# Patient Record
Sex: MALE | Race: WHITE | Employment: FULL TIME | URBAN - NONMETROPOLITAN AREA
[De-identification: names, ages, dates, MRNs, and addresses within clinical notes are randomized per-mention and may not be internally consistent; named-entity substitution may affect disease eponyms.]

---

## 2019-08-31 ENCOUNTER — HOSPITAL ENCOUNTER (EMERGENCY)
Age: 29
Discharge: HOME | End: 2019-08-31
Attending: EMERGENCY MEDICINE
Payer: COMMERCIAL

## 2019-08-31 VITALS
RESPIRATION RATE: 16 BRPM | TEMPERATURE: 98.3 F | HEIGHT: 73 IN | HEART RATE: 88 BPM | WEIGHT: 270 LBS | DIASTOLIC BLOOD PRESSURE: 87 MMHG | SYSTOLIC BLOOD PRESSURE: 148 MMHG | OXYGEN SATURATION: 100 % | BODY MASS INDEX: 35.78 KG/M2

## 2019-08-31 DIAGNOSIS — S01.01XA LACERATION OF SCALP, INITIAL ENCOUNTER: Primary | ICD-10-CM

## 2019-08-31 PROCEDURE — 6370000000 HC RX 637 (ALT 250 FOR IP): Performed by: EMERGENCY MEDICINE

## 2019-08-31 PROCEDURE — 90715 TDAP VACCINE 7 YRS/> IM: CPT | Performed by: EMERGENCY MEDICINE

## 2019-08-31 PROCEDURE — 6360000002 HC RX W HCPCS: Performed by: EMERGENCY MEDICINE

## 2019-08-31 PROCEDURE — 90471 IMMUNIZATION ADMIN: CPT | Performed by: EMERGENCY MEDICINE

## 2019-08-31 PROCEDURE — 99282 EMERGENCY DEPT VISIT SF MDM: CPT

## 2019-08-31 PROCEDURE — 12001 RPR S/N/AX/GEN/TRNK 2.5CM/<: CPT

## 2019-08-31 PROCEDURE — 2709999900 HC NON-CHARGEABLE SUPPLY

## 2019-08-31 RX ORDER — BACITRACIN, NEOMYCIN, POLYMYXIN B 400; 3.5; 5 [USP'U]/G; MG/G; [USP'U]/G
OINTMENT TOPICAL ONCE
Status: COMPLETED | OUTPATIENT
Start: 2019-08-31 | End: 2019-08-31

## 2019-08-31 RX ADMIN — TETANUS TOXOID, REDUCED DIPHTHERIA TOXOID AND ACELLULAR PERTUSSIS VACCINE, ADSORBED 0.5 ML: 5; 2.5; 8; 8; 2.5 SUSPENSION INTRAMUSCULAR at 21:44

## 2019-08-31 RX ADMIN — BACITRACIN, NEOMYCIN, POLYMYXIN B 1 G: 400; 3.5; 5 OINTMENT TOPICAL at 21:44

## 2019-08-31 ASSESSMENT — PAIN DESCRIPTION - LOCATION: LOCATION: HEAD

## 2019-08-31 ASSESSMENT — PAIN SCALES - GENERAL
PAINLEVEL_OUTOF10: 2
PAINLEVEL_OUTOF10: 0

## 2019-08-31 ASSESSMENT — PAIN DESCRIPTION - DESCRIPTORS: DESCRIPTORS: DULL

## 2019-09-01 NOTE — ED PROVIDER NOTES
[address] [address]  Phone: [phone]    Emergency Department encounter      [facility]    Chief Complaint   Patient presents with   Danuta Schools is a 34 y.o. male who presents noted complaint. Patient opened a car door and hit his head on the top of the door. He has a C-shaped 2 cm laceration. Denies loss of consciousness vomiting irritability or mental status changes. PAST MEDICAL HISTORY    History reviewed. No pertinent past medical history.     SURGICAL HISTORY    Past Surgical History:   Procedure Laterality Date    WISDOM TOOTH EXTRACTION         CURRENT MEDICATIONS        ALLERGIES    No Known Allergies    FAMILY HISTORY    Family History   Problem Relation Age of Onset    Diabetes Mother     High Blood Pressure Mother        SOCIAL HISTORY    Social History     Socioeconomic History    Marital status: Single     Spouse name: None    Number of children: None    Years of education: None    Highest education level: None   Occupational History    None   Social Needs    Financial resource strain: None    Food insecurity:     Worry: None     Inability: None    Transportation needs:     Medical: None     Non-medical: None   Tobacco Use    Smoking status: Never Smoker    Smokeless tobacco: Never Used   Substance and Sexual Activity    Alcohol use: Yes     Comment: occ    Drug use: Never    Sexual activity: None   Lifestyle    Physical activity:     Days per week: None     Minutes per session: None    Stress: None   Relationships    Social connections:     Talks on phone: None     Gets together: None     Attends [employer]ism service: None     Active member of club or organization: None     Attends meetings of clubs or organizations: None     Relationship status: None    Intimate partner violence:     Fear of current or ex partner: None     Emotionally abused: None     Physically abused: None     Forced Antibiotic ointment    Patient tolerance of procedure: Tolerated well, no immediate complications          CONSULTS:  None        ED COURSE & MEDICAL DECISION MAKING    Pertinent Labs & Imaging studies reviewed. (See chart for details)  Isolated laceration to the scalp. No loss of consciousness no blood thinners. No neurological symptoms. Blood pressure is elevated although he is under stress from the trauma. He has a 1 similar laceration to the scalp. This does open slightly. I offered staples plus or minus lidocaine. He went ahead and tested staples. 2 were placed without complication. REASSESSMENT  Patient rechecked and updated on lab/xray status, progress and results. .  Patient was reassessed and condition was improved after tx. No further needs at this time. Tetanus will be updated. SCREENINGS  BP (!) 162/94   Pulse 103   Temp 98.3 °F (36.8 °C)   Resp 16   Ht 6' 1\" (1.854 m)   Wt 270 lb (122.5 kg)   SpO2 100%   BMI 35.62 kg/m²      No orders to display       Screening For Hypertension and Follow-up (#317)  patient informed that blood pressure is abnormal and in the pre-hypertension range and should be rechecked by primary care      Screening For Tobacco Use and Cessation Intervention (#226):   reports that he has never smoked. He has never used smokeless tobacco.  Non-smoker not applicable for screen        FINAL IMPRESSION    1.  Laceration of scalp, initial encounter         PATIENT REFERRED TO:  Primary care doctor    In 1 week  Staple removal in 1 week      DISCHARGE MEDICATIONS:  New Prescriptions    No medications on file           Mango Leggett MD  09/01/19 9921

## 2020-02-16 ENCOUNTER — HOSPITAL ENCOUNTER (EMERGENCY)
Age: 30
Discharge: HOME | End: 2020-02-16
Attending: EMERGENCY MEDICINE
Payer: COMMERCIAL

## 2020-02-16 ENCOUNTER — APPOINTMENT (OUTPATIENT)
Dept: CT IMAGING | Age: 30
End: 2020-02-16
Payer: COMMERCIAL

## 2020-02-16 VITALS
RESPIRATION RATE: 18 BRPM | OXYGEN SATURATION: 100 % | BODY MASS INDEX: 37.93 KG/M2 | WEIGHT: 280 LBS | HEIGHT: 72 IN | TEMPERATURE: 98.6 F | HEART RATE: 70 BPM | SYSTOLIC BLOOD PRESSURE: 138 MMHG | DIASTOLIC BLOOD PRESSURE: 78 MMHG

## 2020-02-16 LAB
ALBUMIN SERPL-MCNC: 4.1 GM/DL (ref 3.4–5)
ALP BLD-CCNC: 55 U/L (ref 46–116)
ALT SERPL-CCNC: 60 U/L (ref 14–63)
ANION GAP: 7 MEQ/L (ref 8–16)
AST SERPL-CCNC: 26 U/L (ref 15–37)
BASOPHILS # BLD: 0.7 % (ref 0–3)
BILIRUB SERPL-MCNC: 0.4 MG/DL (ref 0.2–1)
BUN BLDV-MCNC: 10 MG/DL (ref 7–18)
CHLORIDE BLD-SCNC: 103 MEQ/L (ref 98–107)
CO2: 30 MEQ/L (ref 21–32)
CREAT SERPL-MCNC: 1.2 MG/DL (ref 0.6–1.3)
EKG ATRIAL RATE: 85 BPM
EKG P AXIS: 49 DEGREES
EKG P-R INTERVAL: 154 MS
EKG Q-T INTERVAL: 356 MS
EKG QRS DURATION: 98 MS
EKG QTC CALCULATION (BAZETT): 423 MS
EKG R AXIS: 27 DEGREES
EKG T AXIS: 11 DEGREES
EKG VENTRICULAR RATE: 85 BPM
EOSINOPHILS RELATIVE PERCENT: 2.3 % (ref 0–4)
GFR, ESTIMATED: 77 ML/MIN/1.73M2
GLUCOSE BLD-MCNC: 103 MG/DL (ref 74–106)
HCT VFR BLD CALC: 43.4 % (ref 42–52)
HEMOGLOBIN: 14.4 GM/DL (ref 14–18)
LYMPHOCYTES # BLD: 32.2 % (ref 15–47)
MCH RBC QN AUTO: 31.2 PG (ref 27–31)
MCHC RBC AUTO-ENTMCNC: 33.2 GM/DL (ref 33–37)
MCV RBC AUTO: 94.2 FL (ref 80–94)
MONOCYTES: 9.4 % (ref 0–12)
NT PRO BNP: 15 PG/ML (ref 0–450)
PDW BLD-RTO: 12.6 % (ref 11.5–14.5)
PLATELET # BLD: 257 THOU/MM3 (ref 130–400)
PMV BLD AUTO: 6.9 FL (ref 7.4–10.4)
POC CALCIUM: 8.9 MG/DL (ref 8.5–10.1)
POTASSIUM SERPL-SCNC: 3.5 MEQ/L (ref 3.5–5.1)
RBC # BLD: 4.6 MILL/MM3 (ref 4.7–6.1)
SEGS: 55.4 % (ref 43–75)
SODIUM BLD-SCNC: 140 MEQ/L (ref 136–145)
TOTAL PROTEIN: 8.3 GM/DL (ref 6.4–8.2)
TROPONIN I: < 0.017 NG/ML (ref 0.02–0.05)
WBC # BLD: 9.1 THOU/MM3 (ref 4.8–10.8)

## 2020-02-16 PROCEDURE — 83880 ASSAY OF NATRIURETIC PEPTIDE: CPT

## 2020-02-16 PROCEDURE — 6360000004 HC RX CONTRAST MEDICATION: Performed by: EMERGENCY MEDICINE

## 2020-02-16 PROCEDURE — 99285 EMERGENCY DEPT VISIT HI MDM: CPT

## 2020-02-16 PROCEDURE — 36415 COLL VENOUS BLD VENIPUNCTURE: CPT

## 2020-02-16 PROCEDURE — 85025 COMPLETE CBC W/AUTO DIFF WBC: CPT

## 2020-02-16 PROCEDURE — 71275 CT ANGIOGRAPHY CHEST: CPT

## 2020-02-16 PROCEDURE — 2709999900 HC NON-CHARGEABLE SUPPLY

## 2020-02-16 PROCEDURE — 80053 COMPREHEN METABOLIC PANEL: CPT

## 2020-02-16 PROCEDURE — 93005 ELECTROCARDIOGRAM TRACING: CPT | Performed by: EMERGENCY MEDICINE

## 2020-02-16 PROCEDURE — 93010 ELECTROCARDIOGRAM REPORT: CPT | Performed by: INTERNAL MEDICINE

## 2020-02-16 PROCEDURE — 84484 ASSAY OF TROPONIN QUANT: CPT

## 2020-02-16 RX ORDER — M-VIT,TX,IRON,MINS/CALC/FOLIC 27MG-0.4MG
1 TABLET ORAL DAILY
COMMUNITY

## 2020-02-16 RX ORDER — IBUPROFEN 200 MG
200 TABLET ORAL EVERY 6 HOURS PRN
COMMUNITY

## 2020-02-16 RX ORDER — CALCIUM CARBONATE 200(500)MG
2 TABLET,CHEWABLE ORAL DAILY
COMMUNITY

## 2020-02-16 RX ORDER — ACETAMINOPHEN 325 MG/1
650 TABLET ORAL EVERY 6 HOURS PRN
COMMUNITY

## 2020-02-16 RX ORDER — ASPIRIN 325 MG
325 TABLET ORAL DAILY
COMMUNITY

## 2020-02-16 RX ADMIN — IOPAMIDOL 100 ML: 755 INJECTION, SOLUTION INTRAVENOUS at 02:40

## 2020-02-16 ASSESSMENT — PAIN SCALES - GENERAL
PAINLEVEL_OUTOF10: 4
PAINLEVEL_OUTOF10: 4

## 2020-02-16 ASSESSMENT — HEART SCORE: ECG: 0

## 2020-02-16 NOTE — ED PROVIDER NOTES
needs: Medical: None     Non-medical: None   Tobacco Use    Smoking status: Never Smoker    Smokeless tobacco: Never Used   Substance and Sexual Activity    Alcohol use: Yes     Comment: occ    Drug use: Never    Sexual activity: Yes     Partners: Female   Lifestyle    Physical activity:     Days per week: None     Minutes per session: None    Stress: None   Relationships    Social connections:     Talks on phone: None     Gets together: None     Attends Evangelical service: None     Active member of club or organization: None     Attends meetings of clubs or organizations: None     Relationship status: None    Intimate partner violence:     Fear of current or ex partner: None     Emotionally abused: None     Physically abused: None     Forced sexual activity: None   Other Topics Concern    None   Social History Narrative    None       REVIEW OF SYSTEMS    Positive for chest pain. No nausea vomiting diaphoresis. No abdominal pain. Some paresthesias left arm. No weakness  All systems negative except as marked. PHYSICAL EXAM    VITAL SIGNS: BP (!) 143/80   Pulse 90   Temp 98 °F (36.7 °C) (Oral)   Resp 18   Ht 6' (1.829 m)   Wt 280 lb (127 kg)   SpO2 100%   BMI 37.97 kg/m²    Constitutional:  Alert not toxtic or ill, somewhat anxious  HENT:  Normocephalic, Atraumatic, Bilateral external ears normal, Oropharynx moist, No oral exudates, Nose normal.  Cervical Spine: Normal range of motion,  No stridor. No tenderness, Supple,  Eyes:  No discharge or  Swelling,Conjunctiva normal, PERRL, EOMI,  Respiratory: No respiratory distress, Normal breath sounds,  No wheezing, No chest tenderness. Cardiovascular:  Normal heart rate, Normal rhythm, No murmurs, No rubs, No gallops. GI:  No reproducible pain, Bowel sounds normal, Soft, No masses, No pulsatile masses. No tenderness    Musculoskeletal:  Intact distal pulses, No edema, No tenderness, No cyanosis, No clubbing.  Good range of motion in all major joints. No tenderness to palpation or major deformities noted. Back:No tenderness. Integument:  Warm, Dry, No erythema, No rash (on exposed areas)   Lymphatic:  No lymphadenopathy noted. Neurologic:  Alert & oriented x 3, Normal motor function, Normal sensory function, No focal deficits noted. Psychiatric:  Affect normal, Judgment normal, anxious mood  EKG    Sinus rate and rhythm without ischemia or infarction. Heart Score for chest pain patients  History: Slightly Suspicious  ECG: Normal  Patient Age: < 39 years  *Risk factors for Atherosclerotic disease: Hypertension, Obesity, Positive family History  Risk Factors: > 3 Risk factors or history of atherosclerotic disease*  Troponin: < 1X normal limit  Heart Score Total: 2        RADIOLOGY    CTA Chest W WO Contrast   Final Result    No pulmonary emboli or pulmonary infiltrates. Negative exam for acute vascular pathology. **This report has been created using voice recognition software. It may contain minor errors which are inherent in voice recognition technology. **      Final report electronically signed by Dr. Damon Harper on 2/16/2020 3:19 AM          PROCEDURES    none      CONSULTS:  None      CRITICAL CARE:  None    SCREENINGS  BP (!) 143/80   Pulse 90   Temp 98 °F (36.7 °C) (Oral)   Resp 18   Ht 6' (1.829 m)   Wt 280 lb (127 kg)   SpO2 100%   BMI 37.97 kg/m²        Screening For Hypertension and Follow-up (#317)  patient informed that blood pressure is abnormal and in the pre-hypertension range and should be rechecked by primary care      Screening For Tobacco Use and Cessation Intervention (#226):   reports that he has never smoked. He has never used smokeless tobacco.  Non-smoker not applicable for screen      ED COURSE & MEDICAL DECISION MAKING    Pertinent Labs & Imaging studies reviewed. (See chart for details)  Patient presents with some chest discomfort of left arm tingling.   No associate symptoms such as nausea vomiting or diaphoresis. No severe sudden bursting headache syncope back pain or other issues. Is been a more stress than usual.  Have a 9month-old at home. His blood pressure is been high over the last several doctor's office visits    REASSESSMENT  3:34 AM  Patient rechecked and updated on lab/xray status, progress and results. .  Patient was reassessed and condition was improved after tx. No further needs at this time. Lab tests are unremarkable at this time. Creatinine slightly bumped to 1.2. Given some of his chest discomfort with hypertensive episode a CT of the chest was obtained to rule out dissection or other findings and this is negative. Cardiac enzymes and BNP are negative. His repeat blood pressures in the 140 range much improved without intervention. Essentially here he has had just nonspecific discomfort. Sometimes it is positional with his chest.  There is no signs of pericarditis on his EKG or on CT. That is in the differential.  His heart score is 2 which puts him at lower risk although he has some risk factors for heart disease. Discussed this with his wife and father at this time. I recommend repeat blood pressures this week and monitoring at home. They are comfortable with the plan          FINAL IMPRESSION    1.  Atypical chest pain         PATIENT REFERRED TO:  Becca Sauceda  [address] [facility]  [phone]    Call   For evaluation      DISCHARGE MEDICATIONS:  New Prescriptions    No medications on file           Flaquita Cyr MD  02/16/20 7237

## 2023-12-26 ENCOUNTER — OFFICE VISIT (OUTPATIENT)
Dept: PRIMARY CARE CLINIC | Age: 33
End: 2023-12-26
Payer: COMMERCIAL

## 2023-12-26 VITALS
OXYGEN SATURATION: 94 % | SYSTOLIC BLOOD PRESSURE: 124 MMHG | WEIGHT: 301.8 LBS | BODY MASS INDEX: 40.93 KG/M2 | DIASTOLIC BLOOD PRESSURE: 80 MMHG | TEMPERATURE: 98.6 F | HEART RATE: 94 BPM

## 2023-12-26 DIAGNOSIS — J20.8 ACUTE BRONCHITIS DUE TO OTHER SPECIFIED ORGANISMS: Primary | ICD-10-CM

## 2023-12-26 PROCEDURE — G8427 DOCREV CUR MEDS BY ELIG CLIN: HCPCS | Performed by: NURSE PRACTITIONER

## 2023-12-26 PROCEDURE — G8484 FLU IMMUNIZE NO ADMIN: HCPCS | Performed by: NURSE PRACTITIONER

## 2023-12-26 PROCEDURE — 4004F PT TOBACCO SCREEN RCVD TLK: CPT | Performed by: NURSE PRACTITIONER

## 2023-12-26 PROCEDURE — 99203 OFFICE O/P NEW LOW 30 MIN: CPT | Performed by: NURSE PRACTITIONER

## 2023-12-26 PROCEDURE — G8419 CALC BMI OUT NRM PARAM NOF/U: HCPCS | Performed by: NURSE PRACTITIONER

## 2023-12-26 RX ORDER — BENZONATATE 100 MG/1
100 CAPSULE ORAL 3 TIMES DAILY PRN
Qty: 30 CAPSULE | Refills: 0 | Status: SHIPPED | OUTPATIENT
Start: 2023-12-26 | End: 2024-01-05

## 2023-12-26 RX ORDER — PREDNISONE 20 MG/1
40 TABLET ORAL DAILY
Qty: 10 TABLET | Refills: 0 | Status: SHIPPED | OUTPATIENT
Start: 2023-12-26 | End: 2023-12-31

## 2023-12-26 NOTE — PROGRESS NOTES
[facility]  [address]  [address] [place] [address]  Dept: [phone]  Dept Fax: [phone]    Anson Johansen is a 34 y.o. male who presents today for his medical conditions/complaintsas noted below. Anson Johansen is c/o of   Chief Complaint   Patient presents with    Cough     Been going on for a couple weeks. On Monday last week cough worsened. Feels like he has something in his chest has tried mucinex otc and dayquil      HPI:     Patient presents to the walk in clinic for an acute evaluation. Does not normally have a PCP that he follows with. Has not seen anyone for primary care for a while. Denies known exposure to influenza or COVID. URI   This is a new problem. The current episode started 1 to 4 weeks ago (2 weeks ago). The problem has been unchanged. There has been no fever. Associated symptoms include congestion, coughing, headaches and rhinorrhea. Pertinent negatives include no chest pain, diarrhea, ear pain, nausea, rash, sinus pain, sore throat, vomiting or wheezing. Treatments tried: Dyquil, mucinex. The treatment provided mild relief. No results found for: \"LABA1C\"          ( goal A1Cis < 7)   No components found for: \"LABMICR\"  No results found for: \"LDLCHOLESTEROL\", \"LDLCALC\"    (goal LDL is <100)   AST (U/L)   Date Value   02/16/2020 26     ALT (U/L)   Date Value   02/16/2020 60     BUN (mg/dl)   Date Value   02/16/2020 10     BP Readings from Last 3 Encounters:   12/26/23 124/80   02/16/20 138/78   08/31/19 (!) 148/87          (goal 120/80)    No past medical history on file.    Past Surgical History:   Procedure Laterality Date    WISDOM TOOTH EXTRACTION         Family History   Problem Relation Age of Onset    Diabetes Mother     High Blood Pressure Mother        Social History     Tobacco Use    Smoking status: Never    Smokeless tobacco: Never   Substance Use

## 2024-02-26 ENCOUNTER — OFFICE VISIT (OUTPATIENT)
Dept: PRIMARY CARE CLINIC | Age: 34
End: 2024-02-26
Payer: COMMERCIAL

## 2024-02-26 VITALS
BODY MASS INDEX: 40.69 KG/M2 | WEIGHT: 300 LBS | DIASTOLIC BLOOD PRESSURE: 80 MMHG | TEMPERATURE: 98.3 F | HEART RATE: 94 BPM | SYSTOLIC BLOOD PRESSURE: 132 MMHG | OXYGEN SATURATION: 98 %

## 2024-02-26 DIAGNOSIS — H10.31 ACUTE BACTERIAL CONJUNCTIVITIS OF RIGHT EYE: Primary | ICD-10-CM

## 2024-02-26 PROCEDURE — 1036F TOBACCO NON-USER: CPT | Performed by: FAMILY MEDICINE

## 2024-02-26 PROCEDURE — 99203 OFFICE O/P NEW LOW 30 MIN: CPT | Performed by: FAMILY MEDICINE

## 2024-02-26 PROCEDURE — G8427 DOCREV CUR MEDS BY ELIG CLIN: HCPCS | Performed by: FAMILY MEDICINE

## 2024-02-26 PROCEDURE — G8419 CALC BMI OUT NRM PARAM NOF/U: HCPCS | Performed by: FAMILY MEDICINE

## 2024-02-26 PROCEDURE — G8484 FLU IMMUNIZE NO ADMIN: HCPCS | Performed by: FAMILY MEDICINE

## 2024-02-26 RX ORDER — POLYMYXIN B SULFATE AND TRIMETHOPRIM 1; 10000 MG/ML; [USP'U]/ML
1 SOLUTION OPHTHALMIC EVERY 6 HOURS
Qty: 10 ML | Refills: 0 | Status: SHIPPED | OUTPATIENT
Start: 2024-02-26 | End: 2024-03-04

## 2024-02-26 ASSESSMENT — ENCOUNTER SYMPTOMS
DIARRHEA: 0
COUGH: 0
ABDOMINAL PAIN: 0
VOMITING: 0
SORE THROAT: 1
DOUBLE VISION: 0
EYE ITCHING: 1
SHORTNESS OF BREATH: 0
EYE REDNESS: 1
EYE DISCHARGE: 1
SINUS PRESSURE: 0
NAUSEA: 0
PHOTOPHOBIA: 0
EYE PAIN: 1

## 2024-02-26 ASSESSMENT — PATIENT HEALTH QUESTIONNAIRE - PHQ9
SUM OF ALL RESPONSES TO PHQ QUESTIONS 1-9: 0
2. FEELING DOWN, DEPRESSED OR HOPELESS: 0
SUM OF ALL RESPONSES TO PHQ QUESTIONS 1-9: 0
SUM OF ALL RESPONSES TO PHQ QUESTIONS 1-9: 0
SUM OF ALL RESPONSES TO PHQ9 QUESTIONS 1 & 2: 0
SUM OF ALL RESPONSES TO PHQ QUESTIONS 1-9: 0
1. LITTLE INTEREST OR PLEASURE IN DOING THINGS: 0

## 2024-02-26 NOTE — PROGRESS NOTES
[facility]  [address]  [facility] [address]  Dept: [phone]  Dept Fax: [phone]    Xavier Braggis a 33 y.o. male who presents today for his medical conditions/complaints as notedbelow.  Xavier Bragg is c/o of   Chief Complaint   Patient presents with    Conjunctivitis     Daughter had pink eye       HPI:     Here today for conjunctivitis.     Conjunctivitis   The current episode started yesterday. The onset was sudden. The problem occurs continuously. The problem has been unchanged. Nothing relieves the symptoms. Nothing aggravates the symptoms. Associated symptoms include eye itching, congestion, sore throat (very mild), URI, eye discharge, eye pain and eye redness. Pertinent negatives include no fever, no decreased vision, no double vision, no photophobia, no abdominal pain, no diarrhea, no nausea, no vomiting, no ear discharge, no ear pain, no headaches, no hearing loss, no cough and no rash. The eye pain is mild. The right eye is affected. The eye pain is not associated with movement. The eyelid exhibits no abnormality. He has been Behaving normally. He has been Eating and drinking normally. Urine output has been normal. There were sick contacts at home.         History reviewed. No pertinent past medical history.       Social History     Tobacco Use    Smoking status: Never    Smokeless tobacco: Never   Substance Use Topics    Alcohol use: Yes     Comment: occ     Current Outpatient Medications   Medication Sig Dispense Refill    trimethoprim-polymyxin b (POLYTRIM) 81619-4.1 UNIT/ML-% ophthalmic solution Place 1 drop into the right eye every 6 hours for 7 days 10 mL 0     No current facility-administered medications for this visit.        No Known Allergies    Subjective:     Review of Systems   Constitutional:  Negative for activity change, appetite change, chills,  0